# Patient Record
Sex: FEMALE | Race: WHITE | NOT HISPANIC OR LATINO | URBAN - METROPOLITAN AREA
[De-identification: names, ages, dates, MRNs, and addresses within clinical notes are randomized per-mention and may not be internally consistent; named-entity substitution may affect disease eponyms.]

---

## 2018-04-09 ENCOUNTER — OUTPATIENT (OUTPATIENT)
Dept: OUTPATIENT SERVICES | Facility: HOSPITAL | Age: 38
LOS: 1 days | End: 2018-04-09
Payer: COMMERCIAL

## 2018-04-09 DIAGNOSIS — Z01.818 ENCOUNTER FOR OTHER PREPROCEDURAL EXAMINATION: ICD-10-CM

## 2018-04-09 LAB
BLD GP AB SCN SERPL QL: NEGATIVE — SIGNIFICANT CHANGE UP
RH IG SCN BLD-IMP: POSITIVE — SIGNIFICANT CHANGE UP

## 2018-04-10 ENCOUNTER — INPATIENT (INPATIENT)
Facility: HOSPITAL | Age: 38
LOS: 2 days | Discharge: HOME | End: 2018-04-13
Attending: OBSTETRICS & GYNECOLOGY | Admitting: OBSTETRICS & GYNECOLOGY
Payer: COMMERCIAL

## 2018-04-10 ENCOUNTER — RESULT REVIEW (OUTPATIENT)
Age: 38
End: 2018-04-10

## 2018-04-10 VITALS — HEIGHT: 64 IN | WEIGHT: 130.95 LBS

## 2018-04-10 LAB — T PALLIDUM AB TITR SER: NEGATIVE — SIGNIFICANT CHANGE UP

## 2018-04-10 PROCEDURE — 86850 RBC ANTIBODY SCREEN: CPT

## 2018-04-10 PROCEDURE — 86900 BLOOD TYPING SEROLOGIC ABO: CPT

## 2018-04-10 PROCEDURE — 86901 BLOOD TYPING SEROLOGIC RH(D): CPT

## 2018-04-10 RX ORDER — OXYTOCIN 10 UNIT/ML
41 VIAL (ML) INJECTION
Qty: 20 | Refills: 0 | Status: DISCONTINUED | OUTPATIENT
Start: 2018-04-10 | End: 2018-04-13

## 2018-04-10 RX ORDER — HEPARIN SODIUM 5000 [USP'U]/ML
5000 INJECTION INTRAVENOUS; SUBCUTANEOUS EVERY 12 HOURS
Qty: 0 | Refills: 0 | Status: DISCONTINUED | OUTPATIENT
Start: 2018-04-11 | End: 2018-04-13

## 2018-04-10 RX ORDER — OXYCODONE AND ACETAMINOPHEN 5; 325 MG/1; MG/1
1 TABLET ORAL
Qty: 0 | Refills: 0 | Status: DISCONTINUED | OUTPATIENT
Start: 2018-04-10 | End: 2018-04-13

## 2018-04-10 RX ORDER — SODIUM CHLORIDE 9 MG/ML
1000 INJECTION, SOLUTION INTRAVENOUS ONCE
Qty: 0 | Refills: 0 | Status: COMPLETED | OUTPATIENT
Start: 2018-04-10 | End: 2018-04-10

## 2018-04-10 RX ORDER — SODIUM CHLORIDE 9 MG/ML
1000 INJECTION, SOLUTION INTRAVENOUS
Qty: 0 | Refills: 0 | Status: DISCONTINUED | OUTPATIENT
Start: 2018-04-10 | End: 2018-04-13

## 2018-04-10 RX ORDER — DOCUSATE SODIUM 100 MG
100 CAPSULE ORAL
Qty: 0 | Refills: 0 | Status: DISCONTINUED | OUTPATIENT
Start: 2018-04-10 | End: 2018-04-13

## 2018-04-10 RX ORDER — GENTAMICIN SULFATE 40 MG/ML
250 VIAL (ML) INJECTION ONCE
Qty: 0 | Refills: 0 | Status: COMPLETED | OUTPATIENT
Start: 2018-04-11 | End: 2018-04-11

## 2018-04-10 RX ORDER — ONDANSETRON 8 MG/1
4 TABLET, FILM COATED ORAL EVERY 6 HOURS
Qty: 0 | Refills: 0 | Status: DISCONTINUED | OUTPATIENT
Start: 2018-04-10 | End: 2018-04-13

## 2018-04-10 RX ORDER — LANOLIN
1 OINTMENT (GRAM) TOPICAL
Qty: 0 | Refills: 0 | Status: DISCONTINUED | OUTPATIENT
Start: 2018-04-10 | End: 2018-04-13

## 2018-04-10 RX ORDER — GENTAMICIN SULFATE 40 MG/ML
180 VIAL (ML) INJECTION ONCE
Qty: 0 | Refills: 0 | Status: COMPLETED | OUTPATIENT
Start: 2018-04-10 | End: 2018-04-10

## 2018-04-10 RX ORDER — MORPHINE SULFATE 50 MG/1
4 CAPSULE, EXTENDED RELEASE ORAL ONCE
Qty: 0 | Refills: 0 | Status: DISCONTINUED | OUTPATIENT
Start: 2018-04-10 | End: 2018-04-13

## 2018-04-10 RX ORDER — SODIUM CHLORIDE 9 MG/ML
1000 INJECTION, SOLUTION INTRAVENOUS
Qty: 0 | Refills: 0 | Status: DISCONTINUED | OUTPATIENT
Start: 2018-04-10 | End: 2018-04-10

## 2018-04-10 RX ORDER — ACETAMINOPHEN 500 MG
650 TABLET ORAL EVERY 6 HOURS
Qty: 0 | Refills: 0 | Status: DISCONTINUED | OUTPATIENT
Start: 2018-04-10 | End: 2018-04-13

## 2018-04-10 RX ORDER — METOCLOPRAMIDE HCL 10 MG
10 TABLET ORAL ONCE
Qty: 0 | Refills: 0 | Status: DISCONTINUED | OUTPATIENT
Start: 2018-04-10 | End: 2018-04-10

## 2018-04-10 RX ORDER — CITRIC ACID/SODIUM CITRATE 300-500 MG
30 SOLUTION, ORAL ORAL ONCE
Qty: 0 | Refills: 0 | Status: COMPLETED | OUTPATIENT
Start: 2018-04-10 | End: 2018-04-10

## 2018-04-10 RX ORDER — TETANUS TOXOID, REDUCED DIPHTHERIA TOXOID AND ACELLULAR PERTUSSIS VACCINE, ADSORBED 5; 2.5; 8; 8; 2.5 [IU]/.5ML; [IU]/.5ML; UG/.5ML; UG/.5ML; UG/.5ML
0.5 SUSPENSION INTRAMUSCULAR ONCE
Qty: 0 | Refills: 0 | Status: DISCONTINUED | OUTPATIENT
Start: 2018-04-10 | End: 2018-04-13

## 2018-04-10 RX ORDER — IBUPROFEN 200 MG
600 TABLET ORAL EVERY 6 HOURS
Qty: 0 | Refills: 0 | Status: DISCONTINUED | OUTPATIENT
Start: 2018-04-10 | End: 2018-04-13

## 2018-04-10 RX ORDER — NALOXONE HYDROCHLORIDE 4 MG/.1ML
0.1 SPRAY NASAL
Qty: 0 | Refills: 0 | Status: DISCONTINUED | OUTPATIENT
Start: 2018-04-10 | End: 2018-04-13

## 2018-04-10 RX ORDER — SIMETHICONE 80 MG/1
80 TABLET, CHEWABLE ORAL EVERY 4 HOURS
Qty: 0 | Refills: 0 | Status: DISCONTINUED | OUTPATIENT
Start: 2018-04-10 | End: 2018-04-13

## 2018-04-10 RX ORDER — OXYCODONE AND ACETAMINOPHEN 5; 325 MG/1; MG/1
2 TABLET ORAL EVERY 6 HOURS
Qty: 0 | Refills: 0 | Status: DISCONTINUED | OUTPATIENT
Start: 2018-04-10 | End: 2018-04-13

## 2018-04-10 RX ADMIN — Medication 30 MILLILITER(S): at 09:41

## 2018-04-10 RX ADMIN — Medication 600 MILLIGRAM(S): at 17:58

## 2018-04-10 RX ADMIN — SODIUM CHLORIDE 2000 MILLILITER(S): 9 INJECTION, SOLUTION INTRAVENOUS at 08:34

## 2018-04-10 RX ADMIN — Medication 100 MILLIGRAM(S): at 17:59

## 2018-04-10 RX ADMIN — SODIUM CHLORIDE 125 MILLILITER(S): 9 INJECTION, SOLUTION INTRAVENOUS at 08:44

## 2018-04-10 RX ADMIN — Medication 600 MILLIGRAM(S): at 18:50

## 2018-04-10 RX ADMIN — Medication 100 MILLIGRAM(S): at 09:41

## 2018-04-10 RX ADMIN — Medication 200 MILLIGRAM(S): at 10:00

## 2018-04-10 RX ADMIN — Medication 650 MILLIGRAM(S): at 23:03

## 2018-04-10 RX ADMIN — Medication 600 MILLIGRAM(S): at 23:40

## 2018-04-11 LAB
HCT VFR BLD CALC: 31.1 % — LOW (ref 34.5–45)
HGB BLD-MCNC: 10.4 G/DL — LOW (ref 11.5–15.5)
MCHC RBC-ENTMCNC: 30.8 PG — SIGNIFICANT CHANGE UP (ref 27–34)
MCHC RBC-ENTMCNC: 33.4 G/DL — SIGNIFICANT CHANGE UP (ref 32–36)
MCV RBC AUTO: 92 FL — SIGNIFICANT CHANGE UP (ref 80–100)
PLATELET # BLD AUTO: 131 K/UL — LOW (ref 150–400)
RBC # BLD: 3.38 M/UL — LOW (ref 3.8–5.2)
RBC # FLD: 13.5 % — SIGNIFICANT CHANGE UP (ref 10.3–16.9)
WBC # BLD: 13.2 K/UL — HIGH (ref 3.8–10.5)
WBC # FLD AUTO: 13.2 K/UL — HIGH (ref 3.8–10.5)

## 2018-04-11 RX ADMIN — Medication 100 MILLIGRAM(S): at 01:17

## 2018-04-11 RX ADMIN — Medication 600 MILLIGRAM(S): at 11:34

## 2018-04-11 RX ADMIN — Medication 600 MILLIGRAM(S): at 17:24

## 2018-04-11 RX ADMIN — Medication 650 MILLIGRAM(S): at 22:00

## 2018-04-11 RX ADMIN — Medication 100 MILLIGRAM(S): at 21:28

## 2018-04-11 RX ADMIN — Medication 600 MILLIGRAM(S): at 00:17

## 2018-04-11 RX ADMIN — Medication 600 MILLIGRAM(S): at 18:20

## 2018-04-11 RX ADMIN — SIMETHICONE 80 MILLIGRAM(S): 80 TABLET, CHEWABLE ORAL at 15:07

## 2018-04-11 RX ADMIN — Medication 650 MILLIGRAM(S): at 15:43

## 2018-04-11 RX ADMIN — Medication 600 MILLIGRAM(S): at 12:23

## 2018-04-11 RX ADMIN — SIMETHICONE 80 MILLIGRAM(S): 80 TABLET, CHEWABLE ORAL at 21:28

## 2018-04-11 RX ADMIN — Medication 650 MILLIGRAM(S): at 00:01

## 2018-04-11 RX ADMIN — Medication 650 MILLIGRAM(S): at 21:27

## 2018-04-11 RX ADMIN — Medication 600 MILLIGRAM(S): at 06:20

## 2018-04-11 RX ADMIN — HEPARIN SODIUM 5000 UNIT(S): 5000 INJECTION INTRAVENOUS; SUBCUTANEOUS at 05:23

## 2018-04-11 RX ADMIN — Medication 650 MILLIGRAM(S): at 15:05

## 2018-04-11 RX ADMIN — HEPARIN SODIUM 5000 UNIT(S): 5000 INJECTION INTRAVENOUS; SUBCUTANEOUS at 17:24

## 2018-04-11 RX ADMIN — Medication 200 MILLIGRAM(S): at 05:23

## 2018-04-11 RX ADMIN — Medication 600 MILLIGRAM(S): at 23:55

## 2018-04-11 RX ADMIN — Medication 600 MILLIGRAM(S): at 05:24

## 2018-04-11 NOTE — LACTATION INITIAL EVALUATION - NS LACT CON REASON FOR REQ
Mother states she successfully fed oldest child for 14 months and middle child for 21 months. Reported concerns about supply because of baby's cluster feeding last night, wondering if she should start pumping to bring milk in. Provided reassurance and information: baby 28 hours old, 2 urine/2 stool diapers, 1.3% weight loss. Encouraged s2s and frequent feedings at least q3. Mother reports no issues with latching or pain. Answered questions. Encouraged mother to ask for LC assistance as needed./multiparous mom

## 2018-04-12 RX ADMIN — Medication 600 MILLIGRAM(S): at 13:50

## 2018-04-12 RX ADMIN — Medication 600 MILLIGRAM(S): at 06:06

## 2018-04-12 RX ADMIN — Medication 100 MILLIGRAM(S): at 10:33

## 2018-04-12 RX ADMIN — Medication 600 MILLIGRAM(S): at 23:46

## 2018-04-12 RX ADMIN — Medication 650 MILLIGRAM(S): at 04:32

## 2018-04-12 RX ADMIN — Medication 650 MILLIGRAM(S): at 22:23

## 2018-04-12 RX ADMIN — Medication 650 MILLIGRAM(S): at 11:30

## 2018-04-12 RX ADMIN — SIMETHICONE 80 MILLIGRAM(S): 80 TABLET, CHEWABLE ORAL at 21:53

## 2018-04-12 RX ADMIN — SIMETHICONE 80 MILLIGRAM(S): 80 TABLET, CHEWABLE ORAL at 18:42

## 2018-04-12 RX ADMIN — HEPARIN SODIUM 5000 UNIT(S): 5000 INJECTION INTRAVENOUS; SUBCUTANEOUS at 06:06

## 2018-04-12 RX ADMIN — Medication 600 MILLIGRAM(S): at 06:36

## 2018-04-12 RX ADMIN — Medication 600 MILLIGRAM(S): at 00:25

## 2018-04-12 RX ADMIN — Medication 600 MILLIGRAM(S): at 12:54

## 2018-04-12 RX ADMIN — Medication 650 MILLIGRAM(S): at 16:35

## 2018-04-12 RX ADMIN — Medication 650 MILLIGRAM(S): at 21:53

## 2018-04-12 RX ADMIN — Medication 650 MILLIGRAM(S): at 17:30

## 2018-04-12 RX ADMIN — Medication 600 MILLIGRAM(S): at 18:41

## 2018-04-12 RX ADMIN — HEPARIN SODIUM 5000 UNIT(S): 5000 INJECTION INTRAVENOUS; SUBCUTANEOUS at 18:43

## 2018-04-12 RX ADMIN — Medication 600 MILLIGRAM(S): at 19:30

## 2018-04-12 RX ADMIN — SIMETHICONE 80 MILLIGRAM(S): 80 TABLET, CHEWABLE ORAL at 10:33

## 2018-04-12 RX ADMIN — Medication 650 MILLIGRAM(S): at 10:33

## 2018-04-12 RX ADMIN — Medication 650 MILLIGRAM(S): at 05:02

## 2018-04-12 RX ADMIN — Medication 100 MILLIGRAM(S): at 21:53

## 2018-04-13 ENCOUNTER — TRANSCRIPTION ENCOUNTER (OUTPATIENT)
Age: 38
End: 2018-04-13

## 2018-04-13 VITALS
HEART RATE: 71 BPM | OXYGEN SATURATION: 100 % | DIASTOLIC BLOOD PRESSURE: 69 MMHG | SYSTOLIC BLOOD PRESSURE: 110 MMHG | TEMPERATURE: 98 F | RESPIRATION RATE: 18 BRPM

## 2018-04-13 PROCEDURE — 88304 TISSUE EXAM BY PATHOLOGIST: CPT

## 2018-04-13 PROCEDURE — 36415 COLL VENOUS BLD VENIPUNCTURE: CPT

## 2018-04-13 PROCEDURE — 86780 TREPONEMA PALLIDUM: CPT

## 2018-04-13 PROCEDURE — 88341 IMHCHEM/IMCYTCHM EA ADD ANTB: CPT

## 2018-04-13 PROCEDURE — 85027 COMPLETE CBC AUTOMATED: CPT

## 2018-04-13 PROCEDURE — C1765: CPT

## 2018-04-13 PROCEDURE — 88307 TISSUE EXAM BY PATHOLOGIST: CPT

## 2018-04-13 RX ORDER — IBUPROFEN 200 MG
1 TABLET ORAL
Qty: 0 | Refills: 0 | DISCHARGE
Start: 2018-04-13

## 2018-04-13 RX ORDER — ACETAMINOPHEN 500 MG
2 TABLET ORAL
Qty: 0 | Refills: 0 | DISCHARGE
Start: 2018-04-13

## 2018-04-13 RX ORDER — MULTIVIT-MIN/FERROUS GLUCONATE 9 MG/15 ML
0 LIQUID (ML) ORAL
Qty: 0 | Refills: 0 | COMMUNITY

## 2018-04-13 RX ADMIN — Medication 650 MILLIGRAM(S): at 11:26

## 2018-04-13 RX ADMIN — Medication 650 MILLIGRAM(S): at 05:14

## 2018-04-13 RX ADMIN — Medication 600 MILLIGRAM(S): at 00:16

## 2018-04-13 RX ADMIN — Medication 650 MILLIGRAM(S): at 11:46

## 2018-04-13 RX ADMIN — HEPARIN SODIUM 5000 UNIT(S): 5000 INJECTION INTRAVENOUS; SUBCUTANEOUS at 07:18

## 2018-04-13 RX ADMIN — Medication 600 MILLIGRAM(S): at 07:18

## 2018-04-13 RX ADMIN — Medication 600 MILLIGRAM(S): at 13:46

## 2018-04-13 RX ADMIN — Medication 600 MILLIGRAM(S): at 07:49

## 2018-04-13 RX ADMIN — Medication 650 MILLIGRAM(S): at 04:44

## 2018-04-13 RX ADMIN — Medication 600 MILLIGRAM(S): at 13:03

## 2018-04-13 NOTE — DISCHARGE NOTE OB - PLAN OF CARE
uncomplicated recovery resume activities of daily living, pelvic rest, no sexual intercourse for 6 weeks

## 2018-04-13 NOTE — DISCHARGE NOTE OB - PATIENT PORTAL LINK FT
You can access the [facility], offered by [facility], by registering with the following website: [email or web address]

## 2018-04-13 NOTE — DISCHARGE NOTE OB - CARE PLAN
Principal Discharge DX:	 delivery delivered  Goal:	uncomplicated recovery  Assessment and plan of treatment:	resume activities of daily living, pelvic rest, no sexual intercourse for 6 weeks

## 2018-04-13 NOTE — PROGRESS NOTE ADULT - ASSESSMENT
38 y/o POD1 after  delivery.   1. Pain: controlled with OPM.   2. GI: no flatus at this time.   3. : d/c stallworth with TOV   4. DVT prophylaxis: ambulation  5. Dispo: POD3 or POD4
36 y/o , POD3 after  delivery with an uncomplicated postoperative course.    1. Pain: controlled with OPM.   2. GI: + flatus, tolerating a regular diet without issue   3. : voiding   4. DVT prophylaxis: ambulation  5. Dispo: d/c home today.

## 2018-04-13 NOTE — PROGRESS NOTE ADULT - SUBJECTIVE AND OBJECTIVE BOX
Patient evaluated at bedside. No events overnight. She has not passed gas, but is tolerating clear diet without issue. She is breastfeeding.   She reports pain is well controlled with motrin.   She denies headache, dizziness, chest pain, palpitations, shortness of breathe, nausea, vomiting or heavy vaginal bleeding.      Physical Exam:  Vital Signs Last 24 Hrs  T(C): 36.7 (11 Apr 2018 06:14), Max: 36.7 (11 Apr 2018 02:34)  T(F): 98 (11 Apr 2018 06:14), Max: 98.1 (11 Apr 2018 02:34)  HR: 60 (11 Apr 2018 06:14) (60 - 90)  BP: 88/51 (11 Apr 2018 06:14) (87/52 - 119/61)  RR: 18 (11 Apr 2018 06:14) (16 - 20)  SpO2: 97% (11 Apr 2018 06:14) (96% - 98%)    Constitutional: Alert & Oriented x3, No acute distress, cooperative   Gastrointestinal: soft, mildly tender, positive bowel sounds, no rebound or guarding; uterus firm at midline, 2 fb below umbilicus  Incision: steristrips in place; area clean, dry and intact; no erythema or induration   : stallworth, lochia WNL   Extremities: SCDs in place, no calf tenderness or swelling                          10.4   13.2  )-----------( 131      ( 11 Apr 2018 05:47 )             31.1
Patient evaluated at bedside. She has been ambulating without assistance, voiding spontaneously, passing gas, tolerating regular diet and is breastfeeding.  She reports pain is well controlled with motrin and tylenol.   She denies headache, dizziness, chest pain, palpitations, shortness of breathe, nausea, vomiting or heavy vaginal bleeding.      Physical Exam:  Vital Signs Last 24 Hrs  T(C): 36.6 (13 Apr 2018 05:39), Max: 37.1 (12 Apr 2018 21:12)  T(F): 97.8 (13 Apr 2018 05:39), Max: 98.8 (12 Apr 2018 21:12)  HR: 88 (13 Apr 2018 05:39) (68 - 88)  BP: 94/59 (13 Apr 2018 05:39) (94/59 - 110/67)  RR: 18 (13 Apr 2018 05:39) (17 - 18)  SpO2: 99% (13 Apr 2018 05:39) (98% - 99%)    Constitutional: Alert & Oriented x3, No acute distress, cooperative   Gastrointestinal: soft, mildly tender, positive bowel sounds, no rebound or guarding; uterus firm at midline, 2 fb below umbilicus  Incision: steristrips in place; area clean, dry and intact; no erythema or induration   : lochia WNL   Extremities: **SCDs in place, no calf tenderness or swelling
post op day 1 c/s #3 incision clean and dry hct 31 passing a small amount of gas liquids today
post op day 2 c/s passing gas incision clean and dry to advance to regular diet and encourage ambulation possible discharge tomorrow
post op day 3 c/s    tolerating regul;ar diet incision clean and dry extrem nontender  fro discharge

## 2018-04-13 NOTE — DISCHARGE NOTE OB - CARE PROVIDER_API CALL
Farzana Sanchez), Obstetrics and Gynecology  [address] [address]  Phone: [phone]  Fax: [phone]

## 2018-04-13 NOTE — DISCHARGE NOTE OB - MEDICATION SUMMARY - MEDICATIONS TO TAKE
I will START or STAY ON the medications listed below when I get home from the hospital:    ibuprofen 600 mg oral tablet  -- 1 tab(s) by mouth every 6 hours  -- Indication: For PREGNANCY    Tylenol 325 mg oral tablet  -- 2 tab(s) by mouth every 6 hours, As needed, For Temp greater than 38.5 C (101.3 F)  -- Indication: For PREGNANCY

## 2018-04-17 DIAGNOSIS — Z3A.39 39 WEEKS GESTATION OF PREGNANCY: ICD-10-CM

## 2018-04-17 DIAGNOSIS — Z34.83 ENCOUNTER FOR SUPERVISION OF OTHER NORMAL PREGNANCY, THIRD TRIMESTER: ICD-10-CM

## 2018-04-17 DIAGNOSIS — O34.211 MATERNAL CARE FOR LOW TRANSVERSE SCAR FROM PREVIOUS CESAREAN DELIVERY: ICD-10-CM

## 2018-04-17 DIAGNOSIS — N83.292 OTHER OVARIAN CYST, LEFT SIDE: ICD-10-CM

## 2018-04-17 DIAGNOSIS — O34.83 MATERNAL CARE FOR OTHER ABNORMALITIES OF PELVIC ORGANS, THIRD TRIMESTER: ICD-10-CM

## 2018-04-22 LAB — SURGICAL PATHOLOGY STUDY: SIGNIFICANT CHANGE UP

## 2018-04-23 ENCOUNTER — TRANSCRIPTION ENCOUNTER (OUTPATIENT)
Age: 38
End: 2018-04-23

## 2022-12-08 ENCOUNTER — ASOB RESULT (OUTPATIENT)
Age: 42
End: 2022-12-08

## 2022-12-08 ENCOUNTER — APPOINTMENT (OUTPATIENT)
Dept: ANTEPARTUM | Facility: CLINIC | Age: 42
End: 2022-12-08

## 2022-12-08 PROCEDURE — 93976 VASCULAR STUDY: CPT

## 2022-12-08 PROCEDURE — 76813 OB US NUCHAL MEAS 1 GEST: CPT

## 2022-12-08 PROCEDURE — 76801 OB US < 14 WKS SINGLE FETUS: CPT

## 2022-12-08 PROCEDURE — 36415 COLL VENOUS BLD VENIPUNCTURE: CPT

## 2022-12-09 ENCOUNTER — ASOB RESULT (OUTPATIENT)
Age: 42
End: 2022-12-09

## 2022-12-09 ENCOUNTER — APPOINTMENT (OUTPATIENT)
Dept: ANTEPARTUM | Facility: CLINIC | Age: 42
End: 2022-12-09

## 2022-12-09 PROCEDURE — 76945 ECHO GUIDE VILLUS SAMPLING: CPT

## 2022-12-09 PROCEDURE — 59015 CHORION BIOPSY: CPT

## 2023-01-10 ENCOUNTER — APPOINTMENT (OUTPATIENT)
Dept: ANTEPARTUM | Facility: CLINIC | Age: 43
End: 2023-01-10
Payer: COMMERCIAL

## 2023-01-10 ENCOUNTER — ASOB RESULT (OUTPATIENT)
Age: 43
End: 2023-01-10

## 2023-01-10 PROCEDURE — 76811 OB US DETAILED SNGL FETUS: CPT

## 2023-02-13 ENCOUNTER — ASOB RESULT (OUTPATIENT)
Age: 43
End: 2023-02-13

## 2023-02-13 ENCOUNTER — APPOINTMENT (OUTPATIENT)
Dept: ANTEPARTUM | Facility: CLINIC | Age: 43
End: 2023-02-13
Payer: COMMERCIAL

## 2023-02-13 PROCEDURE — 76816 OB US FOLLOW-UP PER FETUS: CPT

## 2023-02-13 PROCEDURE — 76817 TRANSVAGINAL US OBSTETRIC: CPT | Mod: 59

## 2023-04-18 ENCOUNTER — APPOINTMENT (OUTPATIENT)
Dept: ANTEPARTUM | Facility: CLINIC | Age: 43
End: 2023-04-18
Payer: COMMERCIAL

## 2023-04-18 ENCOUNTER — ASOB RESULT (OUTPATIENT)
Age: 43
End: 2023-04-18

## 2023-04-18 PROCEDURE — 76819 FETAL BIOPHYS PROFIL W/O NST: CPT

## 2023-04-18 PROCEDURE — 76816 OB US FOLLOW-UP PER FETUS: CPT

## 2023-05-15 ENCOUNTER — APPOINTMENT (OUTPATIENT)
Dept: ANTEPARTUM | Facility: CLINIC | Age: 43
End: 2023-05-15
Payer: COMMERCIAL

## 2023-05-15 ENCOUNTER — ASOB RESULT (OUTPATIENT)
Age: 43
End: 2023-05-15

## 2023-05-15 PROCEDURE — 76816 OB US FOLLOW-UP PER FETUS: CPT

## 2023-05-15 PROCEDURE — 76819 FETAL BIOPHYS PROFIL W/O NST: CPT

## 2023-05-30 ENCOUNTER — APPOINTMENT (OUTPATIENT)
Dept: ANTEPARTUM | Facility: CLINIC | Age: 43
End: 2023-05-30
Payer: COMMERCIAL

## 2023-05-30 ENCOUNTER — ASOB RESULT (OUTPATIENT)
Age: 43
End: 2023-05-30

## 2023-05-30 PROCEDURE — 76816 OB US FOLLOW-UP PER FETUS: CPT

## 2023-05-30 PROCEDURE — 76819 FETAL BIOPHYS PROFIL W/O NST: CPT

## 2023-06-05 ENCOUNTER — TRANSCRIPTION ENCOUNTER (OUTPATIENT)
Age: 43
End: 2023-06-05

## 2023-06-05 ENCOUNTER — OUTPATIENT (OUTPATIENT)
Dept: OUTPATIENT SERVICES | Facility: HOSPITAL | Age: 43
LOS: 1 days | End: 2023-06-05
Payer: COMMERCIAL

## 2023-06-05 DIAGNOSIS — Z01.818 ENCOUNTER FOR OTHER PREPROCEDURAL EXAMINATION: ICD-10-CM

## 2023-06-05 LAB
ALBUMIN SERPL ELPH-MCNC: 3.7 G/DL — SIGNIFICANT CHANGE UP (ref 3.3–5)
ALP SERPL-CCNC: 159 U/L — HIGH (ref 40–120)
ALT FLD-CCNC: 12 U/L — SIGNIFICANT CHANGE UP (ref 10–45)
ANION GAP SERPL CALC-SCNC: 10 MMOL/L — SIGNIFICANT CHANGE UP (ref 5–17)
APTT BLD: 32.1 SEC — SIGNIFICANT CHANGE UP (ref 27.5–35.5)
AST SERPL-CCNC: 19 U/L — SIGNIFICANT CHANGE UP (ref 10–40)
BILIRUB SERPL-MCNC: 0.4 MG/DL — SIGNIFICANT CHANGE UP (ref 0.2–1.2)
BLD GP AB SCN SERPL QL: NEGATIVE — SIGNIFICANT CHANGE UP
BLD GP AB SCN SERPL QL: NEGATIVE — SIGNIFICANT CHANGE UP
BUN SERPL-MCNC: 7 MG/DL — SIGNIFICANT CHANGE UP (ref 7–23)
CALCIUM SERPL-MCNC: 8.6 MG/DL — SIGNIFICANT CHANGE UP (ref 8.4–10.5)
CHLORIDE SERPL-SCNC: 104 MMOL/L — SIGNIFICANT CHANGE UP (ref 96–108)
CO2 SERPL-SCNC: 25 MMOL/L — SIGNIFICANT CHANGE UP (ref 22–31)
CREAT SERPL-MCNC: 0.57 MG/DL — SIGNIFICANT CHANGE UP (ref 0.5–1.3)
EGFR: 118 ML/MIN/1.73M2 — SIGNIFICANT CHANGE UP
GLUCOSE SERPL-MCNC: 78 MG/DL — SIGNIFICANT CHANGE UP (ref 70–99)
HCT VFR BLD CALC: 36.8 % — SIGNIFICANT CHANGE UP (ref 34.5–45)
HGB BLD-MCNC: 12.4 G/DL — SIGNIFICANT CHANGE UP (ref 11.5–15.5)
INR BLD: 0.87 — LOW (ref 0.88–1.16)
MCHC RBC-ENTMCNC: 32.8 PG — SIGNIFICANT CHANGE UP (ref 27–34)
MCHC RBC-ENTMCNC: 33.7 GM/DL — SIGNIFICANT CHANGE UP (ref 32–36)
MCV RBC AUTO: 97.4 FL — SIGNIFICANT CHANGE UP (ref 80–100)
NRBC # BLD: 0 /100 WBCS — SIGNIFICANT CHANGE UP (ref 0–0)
PLATELET # BLD AUTO: 123 K/UL — LOW (ref 150–400)
POTASSIUM SERPL-MCNC: 3.9 MMOL/L — SIGNIFICANT CHANGE UP (ref 3.5–5.3)
POTASSIUM SERPL-SCNC: 3.9 MMOL/L — SIGNIFICANT CHANGE UP (ref 3.5–5.3)
PROT SERPL-MCNC: 6.1 G/DL — SIGNIFICANT CHANGE UP (ref 6–8.3)
PROTHROM AB SERPL-ACNC: 10.3 SEC — LOW (ref 10.5–13.4)
RBC # BLD: 3.78 M/UL — LOW (ref 3.8–5.2)
RBC # FLD: 13.6 % — SIGNIFICANT CHANGE UP (ref 10.3–14.5)
RH IG SCN BLD-IMP: POSITIVE — SIGNIFICANT CHANGE UP
RH IG SCN BLD-IMP: POSITIVE — SIGNIFICANT CHANGE UP
SODIUM SERPL-SCNC: 139 MMOL/L — SIGNIFICANT CHANGE UP (ref 135–145)
TSH SERPL-MCNC: 1.29 UIU/ML — SIGNIFICANT CHANGE UP (ref 0.27–4.2)
WBC # BLD: 6.55 K/UL — SIGNIFICANT CHANGE UP (ref 3.8–10.5)
WBC # FLD AUTO: 6.55 K/UL — SIGNIFICANT CHANGE UP (ref 3.8–10.5)

## 2023-06-05 PROCEDURE — 85730 THROMBOPLASTIN TIME PARTIAL: CPT

## 2023-06-05 PROCEDURE — 86901 BLOOD TYPING SEROLOGIC RH(D): CPT

## 2023-06-05 PROCEDURE — 86900 BLOOD TYPING SEROLOGIC ABO: CPT

## 2023-06-05 PROCEDURE — 86850 RBC ANTIBODY SCREEN: CPT

## 2023-06-05 PROCEDURE — 80053 COMPREHEN METABOLIC PANEL: CPT

## 2023-06-05 PROCEDURE — 84443 ASSAY THYROID STIM HORMONE: CPT

## 2023-06-05 PROCEDURE — 85610 PROTHROMBIN TIME: CPT

## 2023-06-05 PROCEDURE — 86780 TREPONEMA PALLIDUM: CPT

## 2023-06-05 PROCEDURE — 85027 COMPLETE CBC AUTOMATED: CPT

## 2023-06-06 ENCOUNTER — INPATIENT (INPATIENT)
Facility: HOSPITAL | Age: 43
LOS: 2 days | Discharge: HOME | End: 2023-06-09
Attending: OBSTETRICS & GYNECOLOGY | Admitting: OBSTETRICS & GYNECOLOGY
Payer: COMMERCIAL

## 2023-06-06 VITALS — HEIGHT: 63 IN | WEIGHT: 134.92 LBS

## 2023-06-06 LAB
BLD GP AB SCN SERPL QL: NEGATIVE — SIGNIFICANT CHANGE UP
COVID-19 SPIKE DOMAIN AB INTERP: POSITIVE
COVID-19 SPIKE DOMAIN ANTIBODY RESULT: >250 U/ML — HIGH
RH IG SCN BLD-IMP: POSITIVE — SIGNIFICANT CHANGE UP
SARS-COV-2 IGG+IGM SERPL QL IA: >250 U/ML — HIGH
SARS-COV-2 IGG+IGM SERPL QL IA: POSITIVE
T PALLIDUM AB TITR SER: NEGATIVE — SIGNIFICANT CHANGE UP

## 2023-06-06 PROCEDURE — 88307 TISSUE EXAM BY PATHOLOGIST: CPT | Mod: 26

## 2023-06-06 PROCEDURE — 88302 TISSUE EXAM BY PATHOLOGIST: CPT | Mod: 26

## 2023-06-06 DEVICE — INTERCEED 3 X 4": Type: IMPLANTABLE DEVICE | Status: FUNCTIONAL

## 2023-06-06 DEVICE — ARISTA 3GR: Type: IMPLANTABLE DEVICE | Status: FUNCTIONAL

## 2023-06-06 RX ORDER — CITRIC ACID/SODIUM CITRATE 300-500 MG
30 SOLUTION, ORAL ORAL ONCE
Refills: 0 | Status: DISCONTINUED | OUTPATIENT
Start: 2023-06-06 | End: 2023-06-06

## 2023-06-06 RX ORDER — GENTAMICIN SULFATE 40 MG/ML
320 VIAL (ML) INJECTION ONCE
Refills: 0 | Status: DISCONTINUED | OUTPATIENT
Start: 2023-06-06 | End: 2023-06-06

## 2023-06-06 RX ORDER — OXYTOCIN 10 UNIT/ML
333.33 VIAL (ML) INJECTION
Qty: 20 | Refills: 0 | Status: DISCONTINUED | OUTPATIENT
Start: 2023-06-06 | End: 2023-06-06

## 2023-06-06 RX ORDER — TETANUS TOXOID, REDUCED DIPHTHERIA TOXOID AND ACELLULAR PERTUSSIS VACCINE, ADSORBED 5; 2.5; 8; 8; 2.5 [IU]/.5ML; [IU]/.5ML; UG/.5ML; UG/.5ML; UG/.5ML
0.5 SUSPENSION INTRAMUSCULAR ONCE
Refills: 0 | Status: DISCONTINUED | OUTPATIENT
Start: 2023-06-06 | End: 2023-06-09

## 2023-06-06 RX ORDER — ACETAMINOPHEN 500 MG
975 TABLET ORAL
Refills: 0 | Status: DISCONTINUED | OUTPATIENT
Start: 2023-06-06 | End: 2023-06-09

## 2023-06-06 RX ORDER — BUPIVACAINE 13.3 MG/ML
20 INJECTION, SUSPENSION, LIPOSOMAL INFILTRATION ONCE
Refills: 0 | Status: COMPLETED | OUTPATIENT
Start: 2023-06-06 | End: 2023-06-06

## 2023-06-06 RX ORDER — IBUPROFEN 200 MG
600 TABLET ORAL EVERY 6 HOURS
Refills: 0 | Status: COMPLETED | OUTPATIENT
Start: 2023-06-06 | End: 2024-05-04

## 2023-06-06 RX ORDER — FAMOTIDINE 10 MG/ML
20 INJECTION INTRAVENOUS ONCE
Refills: 0 | Status: DISCONTINUED | OUTPATIENT
Start: 2023-06-06 | End: 2023-06-06

## 2023-06-06 RX ORDER — SODIUM CHLORIDE 9 MG/ML
1000 INJECTION, SOLUTION INTRAVENOUS
Refills: 0 | Status: DISCONTINUED | OUTPATIENT
Start: 2023-06-06 | End: 2023-06-09

## 2023-06-06 RX ORDER — SIMETHICONE 80 MG/1
80 TABLET, CHEWABLE ORAL EVERY 4 HOURS
Refills: 0 | Status: DISCONTINUED | OUTPATIENT
Start: 2023-06-06 | End: 2023-06-09

## 2023-06-06 RX ORDER — LANOLIN
1 OINTMENT (GRAM) TOPICAL EVERY 6 HOURS
Refills: 0 | Status: DISCONTINUED | OUTPATIENT
Start: 2023-06-06 | End: 2023-06-09

## 2023-06-06 RX ORDER — SODIUM CHLORIDE 9 MG/ML
1000 INJECTION, SOLUTION INTRAVENOUS
Refills: 0 | Status: DISCONTINUED | OUTPATIENT
Start: 2023-06-06 | End: 2023-06-06

## 2023-06-06 RX ORDER — DIPHENHYDRAMINE HCL 50 MG
25 CAPSULE ORAL EVERY 6 HOURS
Refills: 0 | Status: DISCONTINUED | OUTPATIENT
Start: 2023-06-06 | End: 2023-06-09

## 2023-06-06 RX ORDER — ENOXAPARIN SODIUM 100 MG/ML
40 INJECTION SUBCUTANEOUS EVERY 24 HOURS
Refills: 0 | Status: DISCONTINUED | OUTPATIENT
Start: 2023-06-07 | End: 2023-06-09

## 2023-06-06 RX ORDER — OXYCODONE HYDROCHLORIDE 5 MG/1
5 TABLET ORAL
Refills: 0 | Status: DISCONTINUED | OUTPATIENT
Start: 2023-06-06 | End: 2023-06-09

## 2023-06-06 RX ORDER — ACETAMINOPHEN 500 MG
1000 TABLET ORAL ONCE
Refills: 0 | Status: COMPLETED | OUTPATIENT
Start: 2023-06-06 | End: 2023-06-06

## 2023-06-06 RX ORDER — MAGNESIUM HYDROXIDE 400 MG/1
30 TABLET, CHEWABLE ORAL
Refills: 0 | Status: DISCONTINUED | OUTPATIENT
Start: 2023-06-06 | End: 2023-06-09

## 2023-06-06 RX ORDER — KETOROLAC TROMETHAMINE 30 MG/ML
30 SYRINGE (ML) INJECTION EVERY 6 HOURS
Refills: 0 | Status: DISCONTINUED | OUTPATIENT
Start: 2023-06-06 | End: 2023-06-07

## 2023-06-06 RX ORDER — OXYCODONE HYDROCHLORIDE 5 MG/1
5 TABLET ORAL ONCE
Refills: 0 | Status: DISCONTINUED | OUTPATIENT
Start: 2023-06-06 | End: 2023-06-09

## 2023-06-06 RX ORDER — OXYTOCIN 10 UNIT/ML
333.33 VIAL (ML) INJECTION
Qty: 20 | Refills: 0 | Status: DISCONTINUED | OUTPATIENT
Start: 2023-06-06 | End: 2023-06-09

## 2023-06-06 RX ORDER — SODIUM CHLORIDE 9 MG/ML
1000 INJECTION, SOLUTION INTRAVENOUS ONCE
Refills: 0 | Status: DISCONTINUED | OUTPATIENT
Start: 2023-06-06 | End: 2023-06-06

## 2023-06-06 RX ADMIN — Medication 30 MILLILITER(S): at 12:30

## 2023-06-06 RX ADMIN — Medication 1000 MILLIUNIT(S)/MIN: at 16:34

## 2023-06-06 RX ADMIN — SODIUM CHLORIDE 2000 MILLILITER(S): 9 INJECTION, SOLUTION INTRAVENOUS at 12:00

## 2023-06-06 RX ADMIN — Medication 100 MILLIGRAM(S): at 13:00

## 2023-06-06 RX ADMIN — Medication 30 MILLIGRAM(S): at 23:38

## 2023-06-06 RX ADMIN — Medication 30 MILLIGRAM(S): at 18:20

## 2023-06-06 RX ADMIN — Medication 975 MILLIGRAM(S): at 22:00

## 2023-06-06 RX ADMIN — Medication 400 MILLIGRAM(S): at 16:30

## 2023-06-06 RX ADMIN — Medication 975 MILLIGRAM(S): at 21:25

## 2023-06-06 RX ADMIN — FAMOTIDINE 20 MILLIGRAM(S): 10 INJECTION INTRAVENOUS at 12:30

## 2023-06-06 RX ADMIN — SIMETHICONE 80 MILLIGRAM(S): 80 TABLET, CHEWABLE ORAL at 18:19

## 2023-06-06 RX ADMIN — Medication 200 MILLIGRAM(S): at 14:30

## 2023-06-06 RX ADMIN — Medication 1 APPLICATION(S): at 18:20

## 2023-06-06 RX ADMIN — SODIUM CHLORIDE 125 MILLILITER(S): 9 INJECTION, SOLUTION INTRAVENOUS at 16:34

## 2023-06-06 RX ADMIN — BUPIVACAINE 20 MILLILITER(S): 13.3 INJECTION, SUSPENSION, LIPOSOMAL INFILTRATION at 14:30

## 2023-06-06 NOTE — PRE-ANESTHESIA EVALUATION ADULT - NSANTHPMHFT_GEN_ALL_CORE
h/o Chiari I malformation s/p posterior fossa decompression 1995, no residual neurological symptoms, mild scoliosis. s/p uncomplicated C/S x3, s/p breast augmentation surgery

## 2023-06-06 NOTE — PATIENT PROFILE OB - FALL HARM RISK - UNIVERSAL INTERVENTIONS
Bed in lowest position, wheels locked, appropriate side rails in place/Call bell, personal items and telephone in reach/Instruct patient to call for assistance before getting out of bed or chair/Non-slip footwear when patient is out of bed/[place] to call system/Physically safe environment - no spills, clutter or unnecessary equipment/Purposeful Proactive Rounding/Room/bathroom lighting operational, light cord in reach

## 2023-06-07 LAB
BASOPHILS # BLD AUTO: 0.05 K/UL — SIGNIFICANT CHANGE UP (ref 0–0.2)
BASOPHILS NFR BLD AUTO: 0.5 % — SIGNIFICANT CHANGE UP (ref 0–2)
BLD GP AB SCN SERPL QL: NEGATIVE — SIGNIFICANT CHANGE UP
EOSINOPHIL # BLD AUTO: 0.01 K/UL — SIGNIFICANT CHANGE UP (ref 0–0.5)
EOSINOPHIL NFR BLD AUTO: 0.1 % — SIGNIFICANT CHANGE UP (ref 0–6)
HCT VFR BLD CALC: 33.1 % — LOW (ref 34.5–45)
HGB BLD-MCNC: 11.3 G/DL — LOW (ref 11.5–15.5)
IMM GRANULOCYTES NFR BLD AUTO: 0.6 % — SIGNIFICANT CHANGE UP (ref 0–0.9)
LYMPHOCYTES # BLD AUTO: 1.54 K/UL — SIGNIFICANT CHANGE UP (ref 1–3.3)
LYMPHOCYTES # BLD AUTO: 14 % — SIGNIFICANT CHANGE UP (ref 13–44)
MCHC RBC-ENTMCNC: 32.8 PG — SIGNIFICANT CHANGE UP (ref 27–34)
MCHC RBC-ENTMCNC: 34.1 GM/DL — SIGNIFICANT CHANGE UP (ref 32–36)
MCV RBC AUTO: 96.2 FL — SIGNIFICANT CHANGE UP (ref 80–100)
MONOCYTES # BLD AUTO: 0.62 K/UL — SIGNIFICANT CHANGE UP (ref 0–0.9)
MONOCYTES NFR BLD AUTO: 5.6 % — SIGNIFICANT CHANGE UP (ref 2–14)
NEUTROPHILS # BLD AUTO: 8.73 K/UL — HIGH (ref 1.8–7.4)
NEUTROPHILS NFR BLD AUTO: 79.2 % — HIGH (ref 43–77)
NRBC # BLD: 0 /100 WBCS — SIGNIFICANT CHANGE UP (ref 0–0)
PLATELET # BLD AUTO: 129 K/UL — LOW (ref 150–400)
RBC # BLD: 3.44 M/UL — LOW (ref 3.8–5.2)
RBC # FLD: 13.3 % — SIGNIFICANT CHANGE UP (ref 10.3–14.5)
RH IG SCN BLD-IMP: POSITIVE — SIGNIFICANT CHANGE UP
T PALLIDUM AB TITR SER: NEGATIVE — SIGNIFICANT CHANGE UP
WBC # BLD: 11.02 K/UL — HIGH (ref 3.8–10.5)
WBC # FLD AUTO: 11.02 K/UL — HIGH (ref 3.8–10.5)

## 2023-06-07 RX ORDER — IBUPROFEN 200 MG
600 TABLET ORAL EVERY 6 HOURS
Refills: 0 | Status: DISCONTINUED | OUTPATIENT
Start: 2023-06-07 | End: 2023-06-09

## 2023-06-07 RX ADMIN — Medication 975 MILLIGRAM(S): at 09:41

## 2023-06-07 RX ADMIN — Medication 975 MILLIGRAM(S): at 15:41

## 2023-06-07 RX ADMIN — Medication 975 MILLIGRAM(S): at 22:30

## 2023-06-07 RX ADMIN — ENOXAPARIN SODIUM 40 MILLIGRAM(S): 100 INJECTION SUBCUTANEOUS at 15:40

## 2023-06-07 RX ADMIN — SIMETHICONE 80 MILLIGRAM(S): 80 TABLET, CHEWABLE ORAL at 09:41

## 2023-06-07 RX ADMIN — Medication 975 MILLIGRAM(S): at 22:02

## 2023-06-07 RX ADMIN — Medication 30 MILLIGRAM(S): at 06:15

## 2023-06-07 RX ADMIN — Medication 975 MILLIGRAM(S): at 03:38

## 2023-06-07 RX ADMIN — Medication 30 MILLIGRAM(S): at 00:15

## 2023-06-07 RX ADMIN — Medication 975 MILLIGRAM(S): at 04:20

## 2023-06-07 RX ADMIN — Medication 30 MILLIGRAM(S): at 05:37

## 2023-06-07 RX ADMIN — Medication 30 MILLIGRAM(S): at 11:50

## 2023-06-07 RX ADMIN — Medication 600 MILLIGRAM(S): at 23:37

## 2023-06-07 RX ADMIN — Medication 600 MILLIGRAM(S): at 18:18

## 2023-06-07 NOTE — LACTATION INITIAL EVALUATION - NS LACT CON REASON FOR REQ
37 wkr, voiding and stooling, mothers 4th child, she breast all prior babies between 1-3yrs each. This baby noted to have a tight lingual band extending toward anterior aspect of tongue. Implications of ankyloglossia were explained to the mother, and ent or ped dental eval recommended, in addition to continued skilled lactation support. Mother also with a recent hx of bilat breast augmentation. Periaroelar incision on right side due to lift, incision uder breast on left side. Implications of this surgery and importance of frequent emptying to avoid risk of engorgement was explained. Baby sleepy during visit but attained a deep, sustained latch to right side and fed ~5min. Mother reports baby had just finished a full feed prior to my visit to room. To continue to monitor and f/u as needed. Referral for lactation telehealth was placed for further support s/p d/c. In-home ibclc consult recommended for further support after d/c, and referral websites provided./multiparous mom

## 2023-06-07 NOTE — LACTATION INITIAL EVALUATION - LACTATION INTERVENTIONS
initiate/review safe skin-to-skin/initiate/review hand expression/initiate/review techniques for position and latch/post discharge community resources provided/reviewed components of an effective feeding and at least 8 effective feedings per day required/reviewed importance of monitoring infant diapers, the breastfeeding log, and minimum output each day/reviewed risks of artificial nipples/reviewed benefits and recommendations for rooming in/reviewed feeding on demand/by cue at least 8 times a day/recommended follow-up with pediatrician within 24 hours of discharge/reviewed indications of inadequate milk transfer that would require supplementation

## 2023-06-08 ENCOUNTER — TRANSCRIPTION ENCOUNTER (OUTPATIENT)
Age: 43
End: 2023-06-08

## 2023-06-08 RX ADMIN — Medication 975 MILLIGRAM(S): at 09:37

## 2023-06-08 RX ADMIN — Medication 600 MILLIGRAM(S): at 11:50

## 2023-06-08 RX ADMIN — Medication 600 MILLIGRAM(S): at 18:59

## 2023-06-08 RX ADMIN — Medication 600 MILLIGRAM(S): at 12:31

## 2023-06-08 RX ADMIN — Medication 975 MILLIGRAM(S): at 14:52

## 2023-06-08 RX ADMIN — Medication 975 MILLIGRAM(S): at 08:59

## 2023-06-08 RX ADMIN — Medication 600 MILLIGRAM(S): at 05:38

## 2023-06-08 RX ADMIN — Medication 975 MILLIGRAM(S): at 21:20

## 2023-06-08 RX ADMIN — Medication 600 MILLIGRAM(S): at 18:10

## 2023-06-08 RX ADMIN — Medication 600 MILLIGRAM(S): at 06:20

## 2023-06-08 RX ADMIN — Medication 975 MILLIGRAM(S): at 15:32

## 2023-06-08 RX ADMIN — Medication 975 MILLIGRAM(S): at 20:20

## 2023-06-08 RX ADMIN — Medication 1 APPLICATION(S): at 20:20

## 2023-06-08 RX ADMIN — Medication 600 MILLIGRAM(S): at 00:15

## 2023-06-08 RX ADMIN — ENOXAPARIN SODIUM 40 MILLIGRAM(S): 100 INJECTION SUBCUTANEOUS at 14:56

## 2023-06-08 NOTE — DISCHARGE NOTE OB - PATIENT PORTAL LINK FT
You can access the FollowMyHealth Patient Portal offered by [facility] by registering at the following website: [email or web address]. By joining [email or web address] FollowMyHealth portal, you will also be able to view your health information using other applications (apps) compatible with our system.

## 2023-06-08 NOTE — DISCHARGE NOTE OB - NS MD DC FALL RISK RISK
For information on Fall & Injury Prevention, visit: [email or web address] OR  [email or web address] OR  https://www.cdc.gov/steadi/patient.html

## 2023-06-08 NOTE — DISCHARGE NOTE OB - HOSPITAL COURSE
Uncomplicated repeat  section and routine post operative recovery meeting all milestones appropriately Admitted for  section.  Uncomplicated surgery and postoperative course.  Acute blood loss anemia noted on post-operative CBC.  Patient stable with normal vital signs.  No intervention necessary.

## 2023-06-08 NOTE — DISCHARGE NOTE OB - CARE PROVIDER_API CALL
Riki Gomez  Obstetrics and Gynecology  [address] [address]  Phone: [phone]  Fax: [phone]  Follow Up Time:    Riki Gomez  Obstetrics and Gynecology  [address] [address]  Phone: [phone]  Fax: [phone]  Follow Up Time: 2 weeks

## 2023-06-08 NOTE — DISCHARGE NOTE OB - CARE PLAN
1 Principal Discharge DX:	Postpartum state  Assessment and plan of treatment:	 delivery, meeting all postoperative milestones.  Follow up in 1-2 weeks.   Principal Discharge DX:	 delivery delivered  Assessment and plan of treatment:	 delivery, meeting all postoperative milestones.  Please follow-up with your OB doctor within 1-2 weeks.  You can resume a regular diet at home and may continue your prenatal vitamins as directed.  Please place nothing in the vagina for 6 weeks (no tampons, sex, douching, tub baths, swimming pools, etc).  If you have severe headaches and/or vision changes, heavy bleeding, or chest pain, please call your provider or go to the nearest Emergency Department.  Please call your OB with any signs of symptoms of infection including fever > 100.4 degrees, severe pain, malodorous vaginal discharge or heavy bleeding requiring more than 1-2 pads/hour.  You can take Motrin 600mg orally every 6 hours and Tylenol 1000mg orally every 6 hours for pain as needed.  Secondary Diagnosis:	Acute postoperative anemia due to expected blood loss

## 2023-06-09 VITALS
HEART RATE: 58 BPM | DIASTOLIC BLOOD PRESSURE: 67 MMHG | RESPIRATION RATE: 18 BRPM | TEMPERATURE: 98 F | SYSTOLIC BLOOD PRESSURE: 115 MMHG | OXYGEN SATURATION: 99 %

## 2023-06-09 PROCEDURE — 88307 TISSUE EXAM BY PATHOLOGIST: CPT

## 2023-06-09 PROCEDURE — C1765: CPT

## 2023-06-09 PROCEDURE — 86900 BLOOD TYPING SEROLOGIC ABO: CPT

## 2023-06-09 PROCEDURE — 86769 SARS-COV-2 COVID-19 ANTIBODY: CPT

## 2023-06-09 PROCEDURE — 36415 COLL VENOUS BLD VENIPUNCTURE: CPT

## 2023-06-09 PROCEDURE — 86901 BLOOD TYPING SEROLOGIC RH(D): CPT

## 2023-06-09 PROCEDURE — C1889: CPT

## 2023-06-09 PROCEDURE — 86850 RBC ANTIBODY SCREEN: CPT

## 2023-06-09 PROCEDURE — 86780 TREPONEMA PALLIDUM: CPT

## 2023-06-09 PROCEDURE — 88302 TISSUE EXAM BY PATHOLOGIST: CPT

## 2023-06-09 PROCEDURE — 85025 COMPLETE CBC W/AUTO DIFF WBC: CPT

## 2023-06-09 PROCEDURE — 59050 FETAL MONITOR W/REPORT: CPT

## 2023-06-09 RX ORDER — ACETAMINOPHEN 500 MG
3 TABLET ORAL
Qty: 0 | Refills: 0 | DISCHARGE
Start: 2023-06-09

## 2023-06-09 RX ORDER — IBUPROFEN 200 MG
1 TABLET ORAL
Qty: 0 | Refills: 0 | DISCHARGE
Start: 2023-06-09

## 2023-06-09 RX ORDER — FERROUS SULFATE 325(65) MG
1 TABLET ORAL
Refills: 0 | DISCHARGE

## 2023-06-09 RX ORDER — FOLIC ACID 0.8 MG
1 TABLET ORAL
Refills: 0 | DISCHARGE

## 2023-06-09 RX ADMIN — Medication 600 MILLIGRAM(S): at 06:20

## 2023-06-09 RX ADMIN — Medication 600 MILLIGRAM(S): at 12:12

## 2023-06-09 RX ADMIN — Medication 975 MILLIGRAM(S): at 09:49

## 2023-06-09 RX ADMIN — Medication 600 MILLIGRAM(S): at 00:05

## 2023-06-09 NOTE — PROGRESS NOTE ADULT - ASSESSMENT
42y Female POD#2  s/p C/S#4 w/ BS, Uncomplicated                                       1. Neuro/Pain:  OPM  2  CV:  VS per routine  3. Pulm: Encourage ISS & Ambulation  4. GI:  Reg  5. : Voiding  6. DVT ppx: SCDs, lovenox 40mg qd  7. Dispo: POD #2 or #3
42y Female POD#3  s/p C/S#4 w/ BS, Uncomplicated                                       1. Neuro/Pain:  OPM  2  CV:  VS per routine  3. Pulm: Encourage ISS & Ambulation  4. GI:  Reg  5. : Voiding  6. DVT ppx: SCDs, lovenox 40mg qd  7. Dispo: POD #3
42y Female POD#1 s/p C/S#4 and BS, Uncomplicated                                       1. Neuro/Pain:  toradol atc, tylenol atc, oxy prn  2  CV:  VS per routine, AM CBC pending  3. Pulm: Encourage ISS & Ambulation  4. GI:  Advance as leola  5. : undergoing TOV  6. DVT ppx: SCDs, lovenox 40mg qd  7. Dispo: POD #2 or #3

## 2023-06-09 NOTE — PROGRESS NOTE ADULT - SUBJECTIVE AND OBJECTIVE BOX
POD 1 s/p rpt c/s x 4  Doing well, no c/o, le[place] reg diet with flatus, ambulating well  VSS Afeb  Fundus firm  Inc CDI  Ext No CCE NT bilat  Hct 33  Imp: POD 1 s/p uncomp rpt c/s x 4/bilat salpingectomy  Doing well   D/c IV  Analgesia prn  Reg diet
POD 2   s/p rpt c/s x 4.  No c/o ambulating well, leola PO.   VSS Afeb  Fundus firm 4cm below unm  Inc CDI  Ext NT bilat No CCE  Imp: Doing well, no c/o  d/c home in am
Patient evaluated at bedside.   She reports pain is well controlled. Bradshaw recently removed. Passing flatus yet.  Not OOB yet. Patient not taking PO yet.  She denies HA, dizziness, CP, palpitations, SOB, n/v, or heavy vaginal bleeding.    Physical Exam:  Vital Signs Last 24 Hrs  T(C): 36.7 (07 Jun 2023 17:58), Max: 36.9 (06 Jun 2023 21:49)  T(F): 98 (07 Jun 2023 17:58), Max: 98.4 (06 Jun 2023 21:49)  HR: 81 (07 Jun 2023 17:58) (46 - 84)  BP: 104/68 (07 Jun 2023 17:58) (93/51 - 105/68)  BP(mean): --  RR: 18 (07 Jun 2023 17:58) (17 - 18)  SpO2: 98% (07 Jun 2023 17:58) (97% - 100%)    Parameters below as of 07 Jun 2023 14:00  Patient On (Oxygen Delivery Method): room air        Gen: NAD  Abd: + BS, soft, nontender, nondistended, no rebound or guarding  Incision clean, dry and intact, compression dressing removed. Abrasion at the L lateral skin incision closure covered with vaseline  uterus firm at midline at the level of the umbilicus  : lochia WNL  Extremities: no calf tenderness                          11.3   11.02 )-----------( 129      ( 07 Jun 2023 08:46 )             33.1               
Patient evaluated at bedside.   She reports pain is well controlled with OPM.  She has been ambulating without assistance, voiding spontaneously, passing gas, tolerating regular diet and is breastfeeding.  She denies HA, dizziness, CP, palpitations, SOB, n/v, or heavy vaginal bleeding.    Physical Exam:  Vital Signs Last 24 Hrs  T(C): 36.5 (09 Jun 2023 02:00), Max: 36.9 (08 Jun 2023 10:21)  T(F): 97.7 (09 Jun 2023 02:00), Max: 98.5 (08 Jun 2023 14:00)  HR: 51 (09 Jun 2023 02:00) (51 - 86)  BP: 102/62 (09 Jun 2023 02:00) (102/62 - 120/79)  BP(mean): --  RR: 18 (09 Jun 2023 02:00) (18 - 18)  SpO2: 97% (09 Jun 2023 02:00) (97% - 98%)    Parameters below as of 08 Jun 2023 14:00  Patient On (Oxygen Delivery Method): room air        Gen: NAD  Abd: + BS, soft, nontender, nondistended, no rebound or guarding  Incision clean, dry and intact  uterus firm at midline below the umbilicus  : lochia WNL  Extremities: no calf tenderness                          11.3   11.02 )-----------( 129      ( 07 Jun 2023 08:46 )             33.1               
Patient evaluated at bedside.   She reports pain is well controlled with OPM.  She has been ambulating without assistance, voiding spontaneously, passing gas, tolerating regular diet and is breastfeeding.  She denies HA, dizziness, CP, palpitations, SOB, n/v, or heavy vaginal bleeding.    Physical Exam:  Vital Signs Last 24 Hrs  T(C): 36.9 (08 Jun 2023 10:21), Max: 36.9 (07 Jun 2023 14:00)  T(F): 98.4 (08 Jun 2023 10:21), Max: 98.4 (07 Jun 2023 14:00)  HR: 70 (08 Jun 2023 10:21) (66 - 84)  BP: 106/72 (08 Jun 2023 10:21) (101/64 - 106/72)  BP(mean): --  RR: 18 (08 Jun 2023 10:21) (17 - 18)  SpO2: 98% (08 Jun 2023 10:21) (97% - 100%)    Parameters below as of 07 Jun 2023 14:00  Patient On (Oxygen Delivery Method): room air        Gen: NAD  Abd: + BS, soft, nontender, nondistended, no rebound or guarding  Incision clean, dry and intact  uterus firm at midline at the level of the umbilicus  : lochia WNL  Extremities: no calf tenderness                          11.3   11.02 )-----------( 129      ( 07 Jun 2023 08:46 )             33.1

## 2023-06-16 LAB — SURGICAL PATHOLOGY STUDY: SIGNIFICANT CHANGE UP

## 2023-06-20 DIAGNOSIS — O34.211 MATERNAL CARE FOR LOW TRANSVERSE SCAR FROM PREVIOUS CESAREAN DELIVERY: ICD-10-CM

## 2023-06-20 DIAGNOSIS — Z88.0 ALLERGY STATUS TO PENICILLIN: ICD-10-CM

## 2023-06-20 DIAGNOSIS — Z3A.37 37 WEEKS GESTATION OF PREGNANCY: ICD-10-CM

## 2023-06-20 DIAGNOSIS — D62 ACUTE POSTHEMORRHAGIC ANEMIA: ICD-10-CM

## 2023-06-20 DIAGNOSIS — M41.9 SCOLIOSIS, UNSPECIFIED: ICD-10-CM

## 2023-06-20 DIAGNOSIS — N99.62: ICD-10-CM

## 2023-06-20 DIAGNOSIS — Z28.09 IMMUNIZATION NOT CARRIED OUT BECAUSE OF OTHER CONTRAINDICATION: ICD-10-CM
